# Patient Record
Sex: FEMALE | Race: WHITE | HISPANIC OR LATINO | Employment: PART TIME | URBAN - NONMETROPOLITAN AREA
[De-identification: names, ages, dates, MRNs, and addresses within clinical notes are randomized per-mention and may not be internally consistent; named-entity substitution may affect disease eponyms.]

---

## 2023-07-14 ENCOUNTER — OFFICE VISIT (OUTPATIENT)
Dept: FAMILY MEDICINE CLINIC | Facility: CLINIC | Age: 48
End: 2023-07-14
Payer: COMMERCIAL

## 2023-07-14 VITALS
HEIGHT: 67 IN | DIASTOLIC BLOOD PRESSURE: 86 MMHG | HEART RATE: 68 BPM | OXYGEN SATURATION: 99 % | TEMPERATURE: 98.6 F | SYSTOLIC BLOOD PRESSURE: 123 MMHG | WEIGHT: 196 LBS | BODY MASS INDEX: 30.76 KG/M2 | RESPIRATION RATE: 18 BRPM

## 2023-07-14 DIAGNOSIS — J98.01 BRONCHOSPASM: ICD-10-CM

## 2023-07-14 DIAGNOSIS — J40 BRONCHITIS: Primary | ICD-10-CM

## 2023-07-14 DIAGNOSIS — R05.2 SUBACUTE COUGH: ICD-10-CM

## 2023-07-14 RX ORDER — ALBUTEROL SULFATE 90 UG/1
2 AEROSOL, METERED RESPIRATORY (INHALATION) EVERY 4 HOURS PRN
Qty: 8 G | Refills: 0 | Status: SHIPPED | OUTPATIENT
Start: 2023-07-14

## 2023-07-14 RX ORDER — IPRATROPIUM BROMIDE AND ALBUTEROL SULFATE 2.5; .5 MG/3ML; MG/3ML
3 SOLUTION RESPIRATORY (INHALATION) EVERY 4 HOURS PRN
Qty: 60 ML | Refills: 0 | Status: SHIPPED | OUTPATIENT
Start: 2023-07-14

## 2023-07-14 NOTE — PROGRESS NOTES
"Chief Complaint  dry cough (Pt c/o dry cough x 7 weeks)    Subjective        Yuly Campbell presents to [facility]  History of Present Illness  The patient presents to the clinic today for an evaluation of a dry cough. She is accompanied by an adult male.     She has had dry intermittent tussis for approximately 7 weeks. When the coughing starts, it becomes constant for approximately 10 minutes and will make her chest tight. In the last 2 weeks, she has been producing clear mucus and can feel the congestion in her chest. She has noticed increased dyspnea in the past several days. Her congestion began on 05/2023 due to seasonal allergies, which turned into tussis on 06/2023. She denies having asthma when she was younger. She can move and be active but occasionally, when she starts coughing, she will become dizzy. She does not like medication and would like to try a nebulizing treatment before inhalers. She has diabetes, which is why she is hesitant to try steroids. She controls her diabetes through exercise and diet.     Objective   Vital Signs:  /86 (BP Location: Left arm, Patient Position: Sitting, Cuff Size: Large Adult)   Pulse 68   Temp 98.6 °F (37 °C) (Infrared)   Resp 18   Ht 170.2 cm (67\") Comment: per patient  Wt 88.9 kg (196 lb)   SpO2 99%   BMI 30.70 kg/m²   Estimated body mass index is 30.7 kg/m² as calculated from the following:    Height as of this encounter: 170.2 cm (67\").    Weight as of this encounter: 88.9 kg (196 lb).             Physical Exam  Vitals and nursing note reviewed.   Constitutional:       General: She is not in acute distress.     Appearance: She is well-developed.   HENT:      Head: Normocephalic and atraumatic.      Right Ear: Tympanic membrane and ear canal normal.      Left Ear: Tympanic membrane and ear canal normal.      Nose: Nose normal.      Right Sinus: No maxillary sinus tenderness or frontal sinus tenderness.      Left Sinus: No " maxillary sinus tenderness or frontal sinus tenderness.      Mouth/Throat:      Mouth: Mucous membranes are moist.      Pharynx: Oropharynx is clear. Uvula midline. No uvula swelling.   Eyes:      Conjunctiva/sclera: Conjunctivae normal.   Neck:      Thyroid: No thyromegaly.      Trachea: No tracheal deviation.   Cardiovascular:      Rate and Rhythm: Normal rate and regular rhythm.      Heart sounds: Normal heart sounds.   Pulmonary:      Effort: Pulmonary effort is normal.      Breath sounds: Normal breath sounds. Examination of the right-upper field reveals wheezing. Examination of the left-upper field reveals wheezing.   Musculoskeletal:      Cervical back: Normal range of motion and neck supple.   Lymphadenopathy:      Cervical: No cervical adenopathy.   Skin:     General: Skin is warm and dry.   Neurological:      General: No focal deficit present.      Mental Status: She is alert and oriented to person, place, and time.   Psychiatric:         Mood and Affect: Mood normal.         Behavior: Behavior normal.      Result Review :                   Assessment and Plan   Diagnoses and all orders for this visit:    1. Bronchitis (Primary)  -     Home Nebulizer  -     XR Chest 2 View    2. Subacute cough  -     XR Chest 2 View    3. Bronchospasm    Other orders  -     albuterol sulfate  (90 Base) MCG/ACT inhaler; Inhale 2 puffs Every 4 (Four) Hours As Needed for Wheezing.  Dispense: 8 g; Refill: 0  -     ipratropium-albuterol (DUO-NEB) 0.5-2.5 mg/3 ml nebulizer; Take 3 mL by nebulization Every 4 (Four) Hours As Needed for Wheezing.  Dispense: 60 mL; Refill: 0      - We will order a chest x-ray.  - If it shows pneumonia, then I would recommend treating her with an antibiotic. If not improving with other treatment recommend to proceed with antibiotic as well.  - duoneb nebulizer ordered  - as needed albuterol inhaler ordered for her to use when she is out of the house  -She wants to avoid steroids at this  time         Follow Up   Return if symptoms worsen or fail to improve.  Patient was given instructions and counseling regarding her condition or for health maintenance advice. Please see specific information pulled into the AVS if appropriate.       JENNIFER Gutierrez    Transcribed from ambient dictation for JENNIFER Gutierrez by Nieves Álvarez.  07/14/23   11:11 CDT    Patient or patient representative verbalized consent to the visit recording.  I have personally performed the services described in this document as transcribed by the above individual, and it is both accurate and complete.

## 2024-09-25 ENCOUNTER — OFFICE VISIT (OUTPATIENT)
Dept: FAMILY MEDICINE CLINIC | Facility: CLINIC | Age: 49
End: 2024-09-25
Payer: COMMERCIAL

## 2024-09-25 VITALS
BODY MASS INDEX: 31.39 KG/M2 | OXYGEN SATURATION: 98 % | WEIGHT: 200 LBS | RESPIRATION RATE: 16 BRPM | HEART RATE: 87 BPM | TEMPERATURE: 98.6 F | SYSTOLIC BLOOD PRESSURE: 137 MMHG | HEIGHT: 67 IN | DIASTOLIC BLOOD PRESSURE: 85 MMHG

## 2024-09-25 DIAGNOSIS — J02.9 PHARYNGITIS, UNSPECIFIED ETIOLOGY: Primary | ICD-10-CM

## 2024-09-25 PROCEDURE — 99213 OFFICE O/P EST LOW 20 MIN: CPT | Performed by: FAMILY MEDICINE

## 2024-09-25 RX ORDER — AMOXICILLIN 500 MG/1
500 CAPSULE ORAL 2 TIMES DAILY
Qty: 20 CAPSULE | Refills: 0 | Status: SHIPPED | OUTPATIENT
Start: 2024-09-25 | End: 2024-10-05

## 2025-01-02 ENCOUNTER — TELEPHONE (OUTPATIENT)
Dept: FAMILY MEDICINE CLINIC | Facility: CLINIC | Age: 50
End: 2025-01-02
Payer: COMMERCIAL

## 2025-01-02 NOTE — TELEPHONE ENCOUNTER
Left msg for pt to call back. She is due for mammo and annual physical. I was calling to see if she wanted to sched

## 2025-05-01 ENCOUNTER — OFFICE VISIT (OUTPATIENT)
Dept: FAMILY MEDICINE CLINIC | Facility: CLINIC | Age: 50
End: 2025-05-01
Payer: COMMERCIAL

## 2025-05-01 VITALS
HEIGHT: 67 IN | WEIGHT: 200.2 LBS | BODY MASS INDEX: 31.42 KG/M2 | DIASTOLIC BLOOD PRESSURE: 88 MMHG | OXYGEN SATURATION: 99 % | HEART RATE: 88 BPM | SYSTOLIC BLOOD PRESSURE: 137 MMHG | TEMPERATURE: 98.2 F

## 2025-05-01 DIAGNOSIS — Z13.220 SCREENING FOR HYPERLIPIDEMIA: ICD-10-CM

## 2025-05-01 DIAGNOSIS — Z86.39 HISTORY OF DIABETES MELLITUS: ICD-10-CM

## 2025-05-01 DIAGNOSIS — R81 GLUCOSURIA: ICD-10-CM

## 2025-05-01 DIAGNOSIS — E66.811 CLASS 1 OBESITY WITHOUT SERIOUS COMORBIDITY WITH BODY MASS INDEX (BMI) OF 31.0 TO 31.9 IN ADULT, UNSPECIFIED OBESITY TYPE: ICD-10-CM

## 2025-05-01 DIAGNOSIS — Z00.00 ANNUAL PHYSICAL EXAM: Primary | ICD-10-CM

## 2025-05-01 DIAGNOSIS — N92.6 MENSES, IRREGULAR: ICD-10-CM

## 2025-05-01 RX ORDER — UBIDECARENONE 75 MG
100 CAPSULE ORAL AS NEEDED
COMMUNITY

## 2025-05-01 RX ORDER — UBIDECARENONE 100 MG
100 CAPSULE ORAL DAILY
COMMUNITY

## 2025-05-01 RX ORDER — NITROFURANTOIN 25; 75 MG/1; MG/1
100 CAPSULE ORAL 2 TIMES DAILY
COMMUNITY
Start: 2025-04-29

## 2025-05-01 RX ORDER — OMEGA-3 FATTY ACIDS/FISH OIL 300-1000MG
CAPSULE ORAL
COMMUNITY

## 2025-05-01 RX ORDER — CETIRIZINE HYDROCHLORIDE 10 MG/1
10 TABLET ORAL DAILY
COMMUNITY

## 2025-05-05 DIAGNOSIS — E11.65 TYPE 2 DIABETES MELLITUS WITH HYPERGLYCEMIA, WITHOUT LONG-TERM CURRENT USE OF INSULIN: Primary | ICD-10-CM

## 2025-05-06 ENCOUNTER — TELEPHONE (OUTPATIENT)
Dept: FAMILY MEDICINE CLINIC | Facility: CLINIC | Age: 50
End: 2025-05-06
Payer: COMMERCIAL

## 2025-05-09 ENCOUNTER — TELEPHONE (OUTPATIENT)
Dept: FAMILY MEDICINE CLINIC | Facility: CLINIC | Age: 50
End: 2025-05-09
Payer: COMMERCIAL

## 2025-05-09 DIAGNOSIS — E11.65 TYPE 2 DIABETES MELLITUS WITH HYPERGLYCEMIA, WITHOUT LONG-TERM CURRENT USE OF INSULIN: ICD-10-CM

## 2025-05-09 NOTE — TELEPHONE ENCOUNTER
Patient would like for the mounjaro to go to [facility] on [address] in [place] now instead please. She would also like to hold off for 1 week on the metfroman as it will cause  a conflict with insurance

## 2025-05-09 NOTE — TELEPHONE ENCOUNTER
Caller: Abram Campbell    Relationship: Emergency Contact    Best call back number: [phone]     What is the best time to reach you: ANYTIME    Who are you requesting to speak with (clinical staff, provider,  specific staff member): CLINICAL    What was the call regarding:   metFORMIN (GLUCOPHAGE) 500 MG tablet  AND   Tirzepatide 2.5 MG/0.5ML solution auto-injector  PLEASE SWITCH OVER TO     [facility] - [place], [place] - [address] AT [facility] - [phone] [facility] [phone] FX     Is it okay if the provider responds through MyChart: NO

## 2025-05-12 NOTE — TELEPHONE ENCOUNTER
Caller: Abram Campbell     Relationship:      Best call back number: [phone]      What is the best time to reach you: ANYTIME     Who are you requesting to speak with (clinical staff, provider,  specific staff member): CLINICAL     What was the call regarding:   metFORMIN (GLUCOPHAGE) 500 MG tablet  AND   Tirzepatide 2.5 MG/0.5ML solution auto-injector  PLEASE SWITCH OVER TO      [facility] - [place] - [address] AT  [address] - [phone] [facility] [phone] FX      Is it okay if the provider responds through [facility]: NO    PLEASE CALL  BACK WHEN THIS IS RE-SENT TO THE NEW PHARM.

## 2025-05-13 NOTE — TELEPHONE ENCOUNTER
Patient's spouse said he needs the mounjaro and metformin to go to [facility] on [address]. Please switch to this pharmacy for these meds.

## 2025-05-15 NOTE — PROGRESS NOTES
Chief Complaint  Urinary Tract Infection (Fast Pace found some ketones in urine. Hx of recurrent UTIs)    Rayshawn Campbell presents to [facility]  History of Present Illness  The patient is a 49-year-old female who presents today for an urgent care follow-up visit for a urinary tract infection (UTI) and glycosuria.    She was diagnosed with diabetes in 2017, at which time her A1c was 11. She has been monitoring her dietary intake and blood glucose levels, noting that her blood glucose levels increase with weight gain. She monitored her blood glucose levels daily from 2017 to 2019, but has not done so recently. Her last comprehensive lab work was conducted in 12/2021, prior to her relocation from [place], and all results were within normal limits. She has experienced a weight gain of approximately 40 pounds. She was prescribed metformin 750 mg, which she takes as two doses of 500 mg in the morning and one dose of 250 mg at night. She does not experience excessive hunger or thirst but notes frequent urination when not experiencing a UTI. She has observed that her blood glucose levels are elevated in the morning following a night of poor sleep, even if they were within normal range the previous night. She has been attempting to manage her stress through medication and increased physical activity, such as walking. She has also made recent dietary changes, including reducing meal size and increasing meal frequency, which have resulted in a decrease in her blood glucose levels from 289 to 265 after a walk, and further down to 220 before bedtime.    She has a history of elevated cholesterol levels, for which statin therapy was recommended, but she declined. Her cholesterol levels subsequently decreased with weight loss.    She believes she is perimenopausal, as evidenced by irregular menstrual cycles over the past year. She had five menstrual periods last year, with the  "current year starting regularly in January and February, but no periods since then. Her periods are inconsistent, sometimes lasting only a day, while other times they last for five days. She occasionally uses a progesterone cream supplement for perimenopause, which provides some relief. She experiences hot flashes and temperature fluctuations, particularly at night.    Her last mammogram and Pap smear were conducted in 2018. She underwent colon cancer screening every five years following the birth of her last child at age 26, due to the development of hemorrhoids and significant bleeding. Her last colonoscopy was performed at age 38, with normal results.    GYNECOLOGICAL HISTORY:  - Last Menstrual Period: 02/2025  - Frequency and Flow: Irregular, sometimes lasting 1 day, other times 5 days    Objective   Vital Signs:  /88 (BP Location: Right arm, Patient Position: Sitting, Cuff Size: Adult)   Pulse 88   Temp 98.2 °F (36.8 °C) (Infrared)   Ht 170.2 cm (67.01\")   Wt 90.8 kg (200 lb 3.2 oz)   SpO2 99%   BMI 31.35 kg/m²   Estimated body mass index is 31.35 kg/m² as calculated from the following:    Height as of this encounter: 170.2 cm (67.01\").    Weight as of this encounter: 90.8 kg (200 lb 3.2 oz).             Physical Exam  Vitals and nursing note reviewed.   Constitutional:       Appearance: She is well-developed. She is obese.   HENT:      Head: Normocephalic and atraumatic.   Eyes:      Conjunctiva/sclera: Conjunctivae normal.   Cardiovascular:      Rate and Rhythm: Normal rate and regular rhythm.   Pulmonary:      Effort: Pulmonary effort is normal.   Musculoskeletal:      Cervical back: Normal range of motion.   Neurological:      General: No focal deficit present.      Mental Status: She is alert and oriented to person, place, and time.   Psychiatric:         Mood and Affect: Mood normal.         Behavior: Behavior normal.        Physical Exam      Result Review :          Results  - Laboratory " Studies:    - Urine test: 3+ glucose and some ketones           Assessment and Plan     Diagnoses and all orders for this visit:    1. Annual physical exam (Primary)  -     CBC & Differential; Future  -     Lipid Panel; Future  -     Comprehensive Metabolic Panel; Future  -     TSH; Future  -     Vitamin B12; Future  -     Vitamin D,25-Hydroxy; Future  -     Follicle stimulating hormone; Future  -     Luteinizing hormone; Future  -     Prolactin; Future  -     Testosterone Free Direct; Future  -     DHEA-sulfate; Future  -     Estradiol; Future  -     Progesterone; Future  -     Hemoglobin A1c; Future  -     T4, free; Future    2. Screening for hyperlipidemia  -     Lipid Panel; Future    3. Glucosuria  -     Hemoglobin A1c; Future    4. Class 1 obesity without serious comorbidity with body mass index (BMI) of 31.0 to 31.9 in adult, unspecified obesity type    5. Menses, irregular  -     TSH; Future  -     Follicle stimulating hormone; Future  -     Luteinizing hormone; Future  -     Prolactin; Future  -     Testosterone Free Direct; Future  -     DHEA-sulfate; Future  -     Estradiol; Future  -     Progesterone; Future  -     T4, free; Future    6. History of diabetes mellitus      Assessment & Plan  1. Diabetes Mellitus: Chronic.  - Diagnosed with diabetes in 2017 with an initial A1c of 11. Managed to lower A1c through weight loss and dietary changes. Recently experienced weight gain and stress, which may have affected blood sugar levels. Has not been checking blood sugar regularly.  - Comprehensive laboratory tests ordered, including A1c, cholesterol, thyroid function, liver enzymes, kidney function, electrolytes, B12, vitamin D, hormone panel, and CBC.  - If A1c is close to 11 again, metformin will be recommended. If around 8, dietary modifications and exercise will be suggested. Prefers to start with metformin and will consider Mounjaro if blood sugar levels do not improve by the end of July.    2.  Perimenopause:   - Reports irregular menstrual cycles and symptoms consistent with perimenopause, including hot flashes and temperature fluctuations.  - Hormone panel included in lab tests to assess hormonal status.  - Uses progesterone cream occasionally to manage symptoms.    3. Health Maintenance:   - Overdue for mammogram and Pap smear, last done in 2018. Due for colonoscopy, last done at age 38.  - Mammogram and Pap smear will be scheduled. Referral to gastroenterologist for colonoscopy provided.  - Cancer screenings discussed, including breast cancer and colon cancer.    4. Obesity:  - Gained about 40 pounds recently, likely due to stress.  - Weight loss injections like Ozempic or Mounjaro discussed, with preference for Mounjaro due to effectiveness and fewer gastrointestinal side effects.  - Advised to maintain a strict diet and exercise regimen. If insurance covers it, Mounjaro will be considered for weight management.    Follow-up  - Follow-up scheduled in 3 months from the date of blood work.       Follow Up     Return in about 3 months (around 8/1/2025) for Recheck.  Patient was given instructions and counseling regarding her condition or for health maintenance advice. Please see specific information pulled into the AVS if appropriate.       Patient or patient representative verbalized consent for the use of Ambient Listening during the visit with  JENNIFER Gutierrez for chart documentation. 5/15/2025  16:58 CDT

## 2025-07-09 DIAGNOSIS — E11.65 TYPE 2 DIABETES MELLITUS WITH HYPERGLYCEMIA, WITHOUT LONG-TERM CURRENT USE OF INSULIN: ICD-10-CM

## 2025-07-10 NOTE — TELEPHONE ENCOUNTER
Rx Refill Note  Requested Prescriptions     Pending Prescriptions Disp Refills    metFORMIN (GLUCOPHAGE) 500 MG tablet 60 tablet 2     Sig: Take 1 tablet by mouth 2 (Two) Times a Day With Meals.      Last office visit with prescribing clinician: 5/1/2025   Next office visit with prescribing clinician: 8/7/2025       Naima Underwood MA  07/10/25, 15:20 CDT

## 2025-08-07 ENCOUNTER — TELEPHONE (OUTPATIENT)
Dept: FAMILY MEDICINE CLINIC | Facility: CLINIC | Age: 50
End: 2025-08-07

## 2025-08-07 DIAGNOSIS — N92.6 MENSES, IRREGULAR: ICD-10-CM

## 2025-08-07 DIAGNOSIS — E11.65 TYPE 2 DIABETES MELLITUS WITH HYPERGLYCEMIA, WITHOUT LONG-TERM CURRENT USE OF INSULIN: Primary | ICD-10-CM

## 2025-08-13 ENCOUNTER — OFFICE VISIT (OUTPATIENT)
Dept: FAMILY MEDICINE CLINIC | Facility: CLINIC | Age: 50
End: 2025-08-13
Payer: COMMERCIAL

## 2025-08-13 VITALS
SYSTOLIC BLOOD PRESSURE: 137 MMHG | DIASTOLIC BLOOD PRESSURE: 77 MMHG | WEIGHT: 196 LBS | TEMPERATURE: 98.4 F | RESPIRATION RATE: 20 BRPM | BODY MASS INDEX: 30.76 KG/M2 | OXYGEN SATURATION: 97 % | HEIGHT: 67 IN | HEART RATE: 80 BPM

## 2025-08-13 DIAGNOSIS — E66.9 OBESITY (BMI 30-39.9): ICD-10-CM

## 2025-08-13 DIAGNOSIS — N95.1 PERIMENOPAUSE: ICD-10-CM

## 2025-08-13 DIAGNOSIS — E11.65 TYPE 2 DIABETES MELLITUS WITH HYPERGLYCEMIA, WITHOUT LONG-TERM CURRENT USE OF INSULIN: Primary | ICD-10-CM

## 2025-08-13 PROCEDURE — 99214 OFFICE O/P EST MOD 30 MIN: CPT | Performed by: NURSE PRACTITIONER

## 2025-08-14 LAB
ALBUMIN/CREAT UR: <28 MG/G CREAT (ref 0–29)
CREAT UR-MCNC: 10.7 MG/DL
MICROALBUMIN UR-MCNC: <3 UG/ML